# Patient Record
Sex: MALE | Race: WHITE | NOT HISPANIC OR LATINO | Employment: FULL TIME | URBAN - METROPOLITAN AREA
[De-identification: names, ages, dates, MRNs, and addresses within clinical notes are randomized per-mention and may not be internally consistent; named-entity substitution may affect disease eponyms.]

---

## 2019-07-15 ENCOUNTER — HOSPITAL ENCOUNTER (OUTPATIENT)
Dept: OTHER | Facility: HOSPITAL | Age: 39
Discharge: HOME | End: 2019-07-15
Attending: FAMILY MEDICINE

## 2019-07-15 ENCOUNTER — OFFICE VISIT CONVERTED (OUTPATIENT)
Dept: FAMILY MEDICINE CLINIC | Age: 39
End: 2019-07-15
Attending: FAMILY MEDICINE

## 2019-07-15 LAB
ALBUMIN SERPL-MCNC: 5 G/DL (ref 3.5–5)
ALBUMIN/GLOB SERPL: 1.8 {RATIO} (ref 1.4–2.6)
ALP SERPL-CCNC: 87 U/L (ref 53–128)
ALT SERPL-CCNC: 126 U/L (ref 10–40)
ANION GAP SERPL CALC-SCNC: 19 MMOL/L (ref 8–19)
AST SERPL-CCNC: 68 U/L (ref 15–50)
BILIRUB SERPL-MCNC: 0.6 MG/DL (ref 0.2–1.3)
BUN SERPL-MCNC: 12 MG/DL (ref 5–25)
BUN/CREAT SERPL: 13 {RATIO} (ref 6–20)
CALCIUM SERPL-MCNC: 10 MG/DL (ref 8.7–10.4)
CHLORIDE SERPL-SCNC: 99 MMOL/L (ref 99–111)
CHOLEST SERPL-MCNC: 297 MG/DL (ref 107–200)
CHOLEST/HDLC SERPL: 4.5 {RATIO} (ref 3–6)
CONV CO2: 25 MMOL/L (ref 22–32)
CONV TOTAL PROTEIN: 7.8 G/DL (ref 6.3–8.2)
CREAT UR-MCNC: 0.89 MG/DL (ref 0.7–1.2)
GFR SERPLBLD BASED ON 1.73 SQ M-ARVRAT: >60 ML/MIN/{1.73_M2}
GLOBULIN UR ELPH-MCNC: 2.8 G/DL (ref 2–3.5)
GLUCOSE SERPL-MCNC: 96 MG/DL (ref 70–99)
HDLC SERPL-MCNC: 66 MG/DL (ref 40–60)
LDLC SERPL CALC-MCNC: 193 MG/DL (ref 70–100)
OSMOLALITY SERPL CALC.SUM OF ELEC: 288 MOSM/KG (ref 273–304)
POTASSIUM SERPL-SCNC: 4.3 MMOL/L (ref 3.5–5.3)
SODIUM SERPL-SCNC: 139 MMOL/L (ref 135–147)
TRIGL SERPL-MCNC: 189 MG/DL (ref 40–150)
VLDLC SERPL-MCNC: 38 MG/DL (ref 5–37)

## 2019-07-19 ENCOUNTER — HOSPITAL ENCOUNTER (OUTPATIENT)
Dept: OTHER | Facility: HOSPITAL | Age: 39
Discharge: HOME | End: 2019-07-19
Attending: FAMILY MEDICINE

## 2019-07-19 LAB
ERYTHROCYTE [DISTWIDTH] IN BLOOD BY AUTOMATED COUNT: 12 % (ref 11.5–14.5)
HBA1C MFR BLD: 14.9 G/DL (ref 14–18)
HCT VFR BLD AUTO: 43.4 % (ref 42–52)
IRON SATN MFR SERPL: 27 % (ref 20–55)
IRON SERPL-MCNC: 96 UG/DL (ref 70–180)
MCH RBC QN AUTO: 32.1 PG (ref 27–31)
MCHC RBC AUTO-ENTMCNC: 34.3 G/DL (ref 33–37)
MCV RBC AUTO: 93.5 FL (ref 80–96)
PLATELET # BLD AUTO: 217 10*3/UL (ref 130–400)
PMV BLD AUTO: 10.6 FL (ref 7.4–10.4)
RBC # BLD AUTO: 4.64 10*6/UL (ref 4.7–6.1)
TIBC SERPL-MCNC: 352 UG/DL (ref 245–450)
TRANSFERRIN SERPL-MCNC: 246 MG/DL (ref 215–365)
WBC # BLD AUTO: 4.49 10*3/UL (ref 4.8–10.8)

## 2019-07-20 LAB
CONV HEPATITIS B SURFACE AG W CONFIRMATION RE: NEGATIVE
CONV HEPATITIS C AB WITH REFLEX TO CONFIRMATION: <0.1 S/CO RATIO (ref 0–0.9)
CONV HEPATITIS COMMENT: NORMAL
GGT SERPL-CCNC: 50 U/L (ref 8–78)
HBV CORE AB SER DONR QL IA: NEGATIVE
HBV SURFACE AB SER QL: 9.9

## 2020-02-20 ENCOUNTER — LAB (OUTPATIENT)
Dept: LAB | Facility: HOSPITAL | Age: 40
End: 2020-02-20

## 2020-02-20 ENCOUNTER — TRANSCRIBE ORDERS (OUTPATIENT)
Dept: ADMINISTRATIVE | Facility: HOSPITAL | Age: 40
End: 2020-02-20

## 2020-02-20 DIAGNOSIS — M25.561 RIGHT KNEE PAIN, UNSPECIFIED CHRONICITY: Primary | ICD-10-CM

## 2020-02-20 DIAGNOSIS — M25.561 RIGHT KNEE PAIN, UNSPECIFIED CHRONICITY: ICD-10-CM

## 2020-02-20 LAB
ALBUMIN SERPL-MCNC: 4.7 G/DL (ref 3.5–5.2)
ALBUMIN/GLOB SERPL: 1.8 G/DL
ALP SERPL-CCNC: 93 U/L (ref 39–117)
ALT SERPL W P-5'-P-CCNC: 35 U/L (ref 1–41)
ANION GAP SERPL CALCULATED.3IONS-SCNC: 16.2 MMOL/L (ref 5–15)
AST SERPL-CCNC: 23 U/L (ref 1–40)
BASOPHILS # BLD AUTO: 0.03 10*3/MM3 (ref 0–0.2)
BASOPHILS NFR BLD AUTO: 0.3 % (ref 0–1.5)
BILIRUB SERPL-MCNC: 0.3 MG/DL (ref 0.2–1.2)
BUN BLD-MCNC: 13 MG/DL (ref 6–20)
BUN/CREAT SERPL: 14 (ref 7–25)
CALCIUM SPEC-SCNC: 9.7 MG/DL (ref 8.6–10.5)
CHLORIDE SERPL-SCNC: 98 MMOL/L (ref 98–107)
CO2 SERPL-SCNC: 23.8 MMOL/L (ref 22–29)
CREAT BLD-MCNC: 0.93 MG/DL (ref 0.76–1.27)
DEPRECATED RDW RBC AUTO: 41.2 FL (ref 37–54)
EOSINOPHIL # BLD AUTO: 0.04 10*3/MM3 (ref 0–0.4)
EOSINOPHIL NFR BLD AUTO: 0.4 % (ref 0.3–6.2)
ERYTHROCYTE [DISTWIDTH] IN BLOOD BY AUTOMATED COUNT: 11.9 % (ref 12.3–15.4)
GFR SERPL CREATININE-BSD FRML MDRD: 90 ML/MIN/1.73
GLOBULIN UR ELPH-MCNC: 2.6 GM/DL
GLUCOSE BLD-MCNC: 106 MG/DL (ref 65–99)
HCT VFR BLD AUTO: 42.9 % (ref 37.5–51)
HGB BLD-MCNC: 14.3 G/DL (ref 13–17.7)
IMM GRANULOCYTES # BLD AUTO: 0.04 10*3/MM3 (ref 0–0.05)
IMM GRANULOCYTES NFR BLD AUTO: 0.4 % (ref 0–0.5)
LYMPHOCYTES # BLD AUTO: 1.87 10*3/MM3 (ref 0.7–3.1)
LYMPHOCYTES NFR BLD AUTO: 20 % (ref 19.6–45.3)
MCH RBC QN AUTO: 31.4 PG (ref 26.6–33)
MCHC RBC AUTO-ENTMCNC: 33.3 G/DL (ref 31.5–35.7)
MCV RBC AUTO: 94.3 FL (ref 79–97)
MONOCYTES # BLD AUTO: 0.6 10*3/MM3 (ref 0.1–0.9)
MONOCYTES NFR BLD AUTO: 6.4 % (ref 5–12)
NEUTROPHILS # BLD AUTO: 6.77 10*3/MM3 (ref 1.7–7)
NEUTROPHILS NFR BLD AUTO: 72.5 % (ref 42.7–76)
NRBC BLD AUTO-RTO: 0.1 /100 WBC (ref 0–0.2)
PLATELET # BLD AUTO: 294 10*3/MM3 (ref 140–450)
PMV BLD AUTO: 10.2 FL (ref 6–12)
POTASSIUM BLD-SCNC: 4.4 MMOL/L (ref 3.5–5.2)
PROT SERPL-MCNC: 7.3 G/DL (ref 6–8.5)
RBC # BLD AUTO: 4.55 10*6/MM3 (ref 4.14–5.8)
SODIUM BLD-SCNC: 138 MMOL/L (ref 136–145)
WBC NRBC COR # BLD: 9.35 10*3/MM3 (ref 3.4–10.8)

## 2020-02-20 PROCEDURE — 80053 COMPREHEN METABOLIC PANEL: CPT

## 2020-02-20 PROCEDURE — 85025 COMPLETE CBC W/AUTO DIFF WBC: CPT

## 2020-02-20 PROCEDURE — 36415 COLL VENOUS BLD VENIPUNCTURE: CPT

## 2020-03-02 ENCOUNTER — LAB REQUISITION (OUTPATIENT)
Dept: LAB | Facility: HOSPITAL | Age: 40
End: 2020-03-02

## 2020-03-02 DIAGNOSIS — S83.242A OTHER TEAR OF MEDIAL MENISCUS, CURRENT INJURY, LEFT KNEE, INITIAL ENCOUNTER: ICD-10-CM

## 2020-03-02 PROCEDURE — 88304 TISSUE EXAM BY PATHOLOGIST: CPT | Performed by: ORTHOPAEDIC SURGERY

## 2020-03-03 LAB
LAB AP CASE REPORT: NORMAL
PATH REPORT.FINAL DX SPEC: NORMAL
PATH REPORT.GROSS SPEC: NORMAL

## 2021-03-11 ENCOUNTER — HOSPITAL ENCOUNTER (OUTPATIENT)
Dept: VACCINE CLINIC | Facility: HOSPITAL | Age: 41
Discharge: HOME | End: 2021-03-11
Attending: INTERNAL MEDICINE

## 2021-04-09 ENCOUNTER — HOSPITAL ENCOUNTER (OUTPATIENT)
Dept: VACCINE CLINIC | Facility: HOSPITAL | Age: 41
Discharge: HOME | End: 2021-04-09
Attending: INTERNAL MEDICINE

## 2021-05-18 NOTE — PROGRESS NOTES
"[facility] [place] 1980     Office/Outpatient Visit    Visit Date: Mon, Jul 15, 2019 10:36 am    Provider: Stevie Joseph MD (Assistant: Steffi Granados MA)    Location: [facility]        Electronically signed by Stevie Joseph MD on  07/16/2019 01:26:20 PM                             SUBJECTIVE:        CC:     Father is a 39 year old White male.  physical         HPI:         Father presents with health checkup.  HEALTH RISK PROFILE (\"At Risk\" items are starred):     Weight: ** Overweight (BMI 27-29%);     Blood Pressure: ** Elevated (BP >140/90);     Lipids: has had elevated cholesterol, but HDL also elevated;     Smoking: Life-long non-smoker; Depression screen is performed and is negative.      Immunization Status: ** >10 years since last Td booster;     Physical Activity: Exercises at least 3 times per week; not running as much;     Safety: Always wears seatbelt;  Smoke detectors are present in the home;     Dental Health: sees dentist;     Eye health:  sees Eye doctor regularly.  Is s/p Lasik;         PHQ-9 Depression Screening: Completed form scanned and in chart; Total Score 0 Alcohol Consumption Screening: Completed form scanned and in chart; Total Score 4     Noted that his blood pressure was a little bit elevated today as it has been in the past.  He has a blood pressure monitor at home but does not use it on a regular basis.  Says that his readings when he did check it before were generally good. He is not sure if there is much family history of hypertension         He does state he is under quite a bit of stress.  Has, however, recently given up the  role and hopes that will ease up some of his time burden. Says that he sleeps well gets generally at least 8 hours of sleep at night.  He is not doing as much running as he used to but stays active and works out several times a week     ROS:     CONSTITUTIONAL:  Negative     CARDIOVASCULAR:  Negative for chest pain, " "orthopnea, paroxysmal nocturnal dyspnea and pedal edema.      RESPIRATORY:  Negative for dyspnea and cough.  Seen at urgent care end of June for resp infection \"maybe\" pneumonia     GASTROINTESTINAL:  Negative for abdominal pain, heartburn, constipation, diarrhea, and stool changes.      GENITOURINARY:  Negative for dysuria and polyuria.      PSYCHIATRIC:  Positive for feelings of stress.   Negative for anxiety or depression.          PMH/FMH/SH:     Last Reviewed on 7/15/2019 10:55 AM by Stevie Joseph    Past Medical History:         Allergies: received immunotherapy in the past;         Surgical History:         Fx of R Fibula in 2002;    Torn left Achellies Tendon;    Lasik x 2;         Family History:     Father: prediabetes     Mother: Healthy;  skin  cancer     Sister(s): 2 sister(s) total;  Allergies     Paternal Grandfather: Coronary Artery Disease     cousin brain cancer         Social History:     Occupation: [email or web address]         Tobacco/Alcohol/Supplements:     Last Reviewed on 7/15/2019 10:40 AM by Steffi Granados    Tobacco: He has never smoked.          Alcohol: Frequency: Daily When he drinks, the average quantity of alcohol is 5-6 per week drinks.          Substance Abuse History:     Last Reviewed on 7/14/2017 08:37 AM by Stevie Joseph        Mental Health History:     Last Reviewed on 7/14/2017 08:37 AM by Stevie Joseph        Communicable Diseases (eg STDs):     Last Reviewed on 7/14/2017 08:37 AM by Stevie Joseph            Allergies:     Last Reviewed on 7/15/2019 10:40 AM by Steffi Granados      No Known Drug Allergies.         Current Medications:     Last Reviewed on 7/15/2019 10:40 AM by Steffi Granados    None        OBJECTIVE:        Vitals:         Current: 7/15/2019 10:42:25 AM    Ht:  6 ft, 4 in;  Wt: 227.6 lbs;  BMI: 27.7    T: 97.7 F (oral);  BP: 150/85 mm Hg (left arm, sitting);  P: 60 bpm (left arm (BP Cuff), sitting);  sCr: 0.88 mg/dL;  " GFR: 127.31        Repeat:     11:13:28 AM     BP:   152/98mm Hg (right arm, sitting, by stiles)         Exams:     PHYSICAL EXAM:     GENERAL:  well developed and nourished; appropriately groomed; in no apparent distress;     EYES: Nonicteric and with unremarkable lids, iris and pupils;     E/N/T:  normal EACs, TMs, nasal/oral mucosa, teeth, gingiva, and oropharynx;     NECK: carotid exam reveals no bruits;     RESPIRATORY: normal respiratory rate and pattern with no distress; normal breath sounds with no rales, rhonchi, wheezes or rubs;     CARDIOVASCULAR: normal rate; rhythm is regular;  no systolic murmur;     GASTROINTESTINAL: nontender, nondistended; no hepatosplenomegaly or masses; no bruits;     LYMPHATIC: no enlargement of cervical or facial nodes;     SKIN:  no significant rashes or lesions; no suspicious moles;     MUSCULOSKELETAL: normal overall tone No pedal edema.;     NEUROLOGIC: No lateralizing deficit.;     PSYCHIATRIC:  appropriate affect and demeanor; normal speech pattern; grossly normal memory;         Procedures:     Vaccination against hepatitis A     1. Hepatitis A (adult): 1.0 ml given IM in the right upper arm; administered by [facility];  lot number [id number]; expires 5-             ASSESSMENT           V70.0   Z00.00  Health checkup              DDx:     V79.0   Z13.31  Screening for depression              DDx:     V05.3   Z23  Vaccination against hepatitis A              DDx:     796.2   R03.0  Elevated blood pressure without a diagnosis of hypertension              DDx:     308.3   F43.9  Situational stress reaction              DDx:         ORDERS:         Lab Orders:       24262  HTNLP - [facility] CMP AND LIPID: [id number], [id number]  (Send-Out)           Procedures Ordered:       89344  HepA vaccine adult dose for intramuscular use  (In-House)         20202  Immunization administration; one vaccine  (In-House)           Other Orders:         Depression screen negative  (In-House)                    PLAN:          Health checkup By and large he seems to be in good health.  His weight is up a few pounds and of course the blood pressures more elevated today than I would want it to stay.  He does come in so infrequently I think that we could just be getting a white coat type phenomenon.          Screening for depression     MIPS PHQ-9 Depression Screening: Completed form scanned and in chart; Total Score 0; Negative Depression Screen           Orders:         Depression screen negative  (In-House)            Vaccination against hepatitis A He says that he got some kind of immunization that was appropriate to his community of [facility], but is not sure what it was.  He thinks that he only got one injection.  It was greater than 6 months ago.  I suspect it may have been hepatitis a immunization         IMMUNIZATIONS given today: Hepatitis A.            Orders:       50379  HepA vaccine adult dose for intramuscular use  (In-House)         07115  Immunization administration; one vaccine  (In-House)            Elevated blood pressure without a diagnosis of hypertension     LABORATORY:  Labs ordered to be performed today include HTN/Lipid Panel: CMP, Lipid.            Orders:       30293  HTN - [facility] CMP AND LIPID: [id number]  (Send-Out)            Situational stress reaction We discussed the importance of peer group support .  He is going on retreat this week too, so that should help him re-center.             Patient Recommendations:    Dragon transcription disclaimer:        Much of this encounter note is an electronic transcription/translation of spoken language to printed text.  The electronic translation of spoken language may permit erroneous, or at times, nonsensical words or phrases to be inadvertently transcribed.  Although I have reviewed the note for such errors, some may still exist.             CHARGE CAPTURE           **Please note: ICD descriptions below are intended for billing  purposes only and may not represent clinical diagnoses**        Primary Diagnosis:         V70.0 Health checkup            Z00.00    Encounter for general adult medical examination without abnormal findings              Orders:          34043   Preventive medicine, established patient, age 18-39 years  (In-House)           V79.0 Screening for depression            Z13.31    Encounter for screening for depression              Orders:             Depression screen negative  (In-House)           V05.3 Vaccination against hepatitis A            Z23    Encounter for immunization              Orders:          62417   HepA vaccine adult dose for intramuscular use  (In-House)             19315   Immunization administration; one vaccine  (In-House)           796.2 Elevated blood pressure without a diagnosis of hypertension            R03.0    Elevated blood-pressure reading, without diagnosis of hypertension    308.3 Situational stress reaction            F43.9    Reaction to severe stress, unspecified

## 2021-07-01 VITALS
BODY MASS INDEX: 27.72 KG/M2 | HEIGHT: 76 IN | TEMPERATURE: 97.7 F | SYSTOLIC BLOOD PRESSURE: 152 MMHG | DIASTOLIC BLOOD PRESSURE: 98 MMHG | HEART RATE: 60 BPM | WEIGHT: 227.6 LBS

## 2022-04-06 ENCOUNTER — TRANSCRIBE ORDERS (OUTPATIENT)
Dept: ADMINISTRATIVE | Facility: HOSPITAL | Age: 42
End: 2022-04-06

## 2022-04-06 ENCOUNTER — HOSPITAL ENCOUNTER (OUTPATIENT)
Dept: GENERAL RADIOLOGY | Facility: HOSPITAL | Age: 42
Discharge: HOME | End: 2022-04-06
Admitting: NURSE PRACTITIONER

## 2022-04-06 DIAGNOSIS — R05.9 COUGH: ICD-10-CM

## 2022-04-06 DIAGNOSIS — R05.9 COUGH: Primary | ICD-10-CM

## 2022-04-06 PROCEDURE — 71046 X-RAY EXAM CHEST 2 VIEWS: CPT

## 2022-04-29 ENCOUNTER — TRANSCRIBE ORDERS (OUTPATIENT)
Dept: ADMINISTRATIVE | Facility: HOSPITAL | Age: 42
End: 2022-04-29

## 2022-04-29 ENCOUNTER — HOSPITAL ENCOUNTER (OUTPATIENT)
Dept: GENERAL RADIOLOGY | Facility: HOSPITAL | Age: 42
Discharge: HOME | End: 2022-04-29
Admitting: NURSE PRACTITIONER

## 2022-04-29 DIAGNOSIS — R91.8 OPACITY OF LUNG ON IMAGING STUDY: Primary | ICD-10-CM

## 2022-04-29 DIAGNOSIS — R91.8 OPACITY OF LUNG ON IMAGING STUDY: ICD-10-CM

## 2022-04-29 PROCEDURE — 71046 X-RAY EXAM CHEST 2 VIEWS: CPT
